# Patient Record
Sex: FEMALE | Race: WHITE
[De-identification: names, ages, dates, MRNs, and addresses within clinical notes are randomized per-mention and may not be internally consistent; named-entity substitution may affect disease eponyms.]

---

## 2020-11-04 ENCOUNTER — HOSPITAL ENCOUNTER (EMERGENCY)
Dept: HOSPITAL 7 - FB.ED | Age: 45
Discharge: NURSING, REHAB OR LONG-TERM CARE | End: 2020-11-04
Payer: MEDICAID

## 2020-11-04 DIAGNOSIS — Z88.5: ICD-10-CM

## 2020-11-04 DIAGNOSIS — I25.10: ICD-10-CM

## 2020-11-04 DIAGNOSIS — Z95.1: ICD-10-CM

## 2020-11-04 DIAGNOSIS — E78.00: ICD-10-CM

## 2020-11-04 DIAGNOSIS — E11.40: ICD-10-CM

## 2020-11-04 DIAGNOSIS — Z79.4: ICD-10-CM

## 2020-11-04 DIAGNOSIS — Z79.899: ICD-10-CM

## 2020-11-04 DIAGNOSIS — I10: ICD-10-CM

## 2020-11-04 DIAGNOSIS — Z88.6: ICD-10-CM

## 2020-11-04 DIAGNOSIS — F17.210: ICD-10-CM

## 2020-11-04 DIAGNOSIS — E03.9: ICD-10-CM

## 2020-11-04 DIAGNOSIS — G45.9: Primary | ICD-10-CM

## 2020-11-04 DIAGNOSIS — Z79.82: ICD-10-CM

## 2020-11-04 DIAGNOSIS — I25.2: ICD-10-CM

## 2020-11-04 DIAGNOSIS — M19.90: ICD-10-CM

## 2020-11-04 DIAGNOSIS — J45.909: ICD-10-CM

## 2020-11-04 NOTE — CR
INDICATION:  Aphasia.  



CHEST, ONE VIEW:  A single AP upright view of the chest was obtained 11/04/20 - 
no comparisons available.  



The heart did not appear enlarged or abnormal in shape. 



Median sternotomy is noted with the superior most two wire sutures fractured.  



An active infiltrate or effusion was not identified.  



IMPRESSION:  No acute process. 



MTDD

## 2020-11-04 NOTE — EDM.PDOC
ED HPI GENERAL MEDICAL PROBLEM





- General


Chief Complaint: Neuro Symptoms/Deficits


Stated Complaint: TROUBLE SPEAKING


Time Seen by Provider: 20 02:45


Source of Information: Reports: Patient


History Limitations: Reports: No Limitations





- History of Present Illness


INITIAL COMMENTS - FREE TEXT/NARRATIVE: 





Patient presented to the ED because of word finding difficulty at about 0200 

when she was talking to her daughter. The boyfriend talked to Dilia and he 

noticed that she has difficulty in finding the words to say and it takes her a 

while to respond. She denies any double or blurry vision, no motor or sensory 

deficits. She scored 1 on the NIHSS.





- Related Data


                                    Allergies











Allergy/AdvReac Type Severity Reaction Status Date / Time


 


codeine Allergy  Vomiting Verified 20 02:46


 


naproxen [From Aleve] Allergy  Rash Verified 20 02:46











Home Meds: 


                                    Home Meds





Amiodarone [Cordarone] 200 mg PO DAILY 19 [History]


Furosemide 20 mg DAILY 19 [History]


Insulin Glargine,Hum.Rec.Anlog [Basaglar Kwikpen U-100] 30 unit SQ BEDTIME 

19 [History]


Levothyroxine [Synthroid] 50 mcg PO ACBREAKFAST 19 [History]


atorvaSTATin Calcium [Atorvastatin Calcium] 80 mg PO 1200 19 [History]


carvediloL [Carvedilol] 6.25 mg PO DAILY 19 [History]


metFORMIN [Glucophage XR] 500 mg PO BIDMEALS 19 [History]


Aspirin [Ecotrin EC] 81 mg PO DAILY 20 [History]











Past Medical History


Cardiovascular History: Reports: CAD, High Cholesterol, Hypertension, MI


Other Cardiovascular History: 3 vessel bypass


Respiratory History: Reports: Asthma


Gastrointestinal History: Reports: Chronic Constipation, GERD, GI Bleed, PUD


Genitourinary History: Reports: None


OB/GYN History: Reports: Pregnancy


Other OB/GYN History: [id number]


Musculoskeletal History: Reports: Arthritis


Neurological History: Reports: Neuropathy, Diabetic


Endocrine/Metabolic History: Reports: Diabetes, Type II, Hypothyroidism





- Infectious Disease History


Infectious Disease History: Reports: Chicken Pox, Shingles





- Past Surgical History


HEENT Surgical History: Reports: Myringotomy w Tube(s)


Other HEENT Surgeries/Procedures: bilat tubes in ears


Cardiovascular Surgical History: Reports: Coronary Artery Bypass


GI Surgical History: Reports: EGD


Female  Surgical History: Reports:  Section, Tubal Ligation





Social & Family History





- Family History


Family Medical History: Noncontributory


Endocrine/Metabolic: Reports: Diabetes, Type I





- Tobacco Use


Tobacco Use Status *Q: Current Every Day Tobacco User


Years of Tobacco use: 30


Packs/Tins Daily: 0.5





- Caffeine Use


Caffeine Use: Reports: Soda





- Recreational Drug Use


Recreational Drug Use: No





- Living Situation & Occupation


Occupation: Unemployed





ED ROS GENERAL





- Review of Systems


Review Of Systems: See Below


Constitutional: Reports: No Symptoms


HEENT: Reports: No Symptoms


Respiratory: Reports: No Symptoms


Cardiovascular: Reports: No Symptoms


Endocrine: Reports: No Symptoms


GI/Abdominal: Reports: No Symptoms


: Reports: No Symptoms


Musculoskeletal: Reports: No Symptoms


Skin: Reports: No Symptoms


Neurological: Reports: Other (delayed in verbal response)





ED EXAM, NEURO





- Physical Exam


Exam: See Below


Exam Limited By: No Limitations


General Appearance: Alert, No Apparent Distress


Eye Exam: Bilateral Eye: PERRL


Ears: Normal External Exam, Normal Canal


Nose: Normal Inspection, Normal Mucosa


Throat/Mouth: Normal Inspection, Normal Lips, Normal Teeth


Head Exam: Atraumatic, Normocephalic


Neck: Normal Inspection, Supple, Non-Tender, Full Range of Motion


Respiratory/Chest: No Respiratory Distress, Lungs Clear, Normal Breath Sounds


Cardiovascular: Normal Peripheral Pulses, Regular Rate, Rhythm, No Edema


GI/Abdominal: Normal Bowel Sounds, Soft, Non-Tender, No Organomegaly


Neurological: Alert, Normal Mood/Affect, Normal Dorsiflexion, CN II-XII Intact, 

Normal Plantar Flexion, Normal Gait, No Motor/Sensory Deficits, Oriented x 3





Course





- Vital Signs


Text/Narrative:: 





Labs/EKG/CXR/Head CT result was discussed with patient


Patient refused to go to [place] by an ambulance. She said she want to see her 

kids first and then her boyfriend will drop her to [place].


Last Recorded V/S: 





                                Last Vital Signs











Temp  36.7 C   20 02:40


 


Pulse  83   20 02:40


 


Resp  18   20 02:40


 


BP  165/88 H  20 02:40


 


Pulse Ox  100   20 02:40














- Orders/Labs/Meds


Orders: 





                               Active Orders 24 hr











 Category Date Time Status


 


 EKG Documentation Completion [RC] ASDIRECTED Care  20 03:05 Active


 


 Chest 1V Frontal [CR] Stat Exams  20 03:03 Taken


 


 Head wo Cont [CT] Stat Exams  20 03:03 Taken


 


 Sodium Chloride 0.9% [Saline Flush] Med  20 03:03 Active





 10 ml FLUSH ASDIRECTED PRN   


 


 Saline Lock Insert [OM.PC] Routine Oth  20 03:03 Ordered


 


 EKG 12 Lead [EK] Routine Ther  20 03:03 Ordered








                                Medication Orders





Sodium Chloride (Saline Flush)  10 ml FLUSH ASDIRECTED PRN


   PRN Reason: Keep Vein Open








Labs: 





                                Laboratory Tests











  20 Range/Units





  02:59 02:59 02:59 


 


WBC  9.3    (4.5-12.0)  X10-3/uL


 


RBC  4.39    (3.23-5.20)  x10(6)uL


 


Hgb  13.6    (11.5-15.5)  g/dL


 


Hct  39.9    (30.0-51.3)  %


 


MCV  91.0    (80-96)  fL


 


MCH  31.0    (27.7-33.6)  pg


 


MCHC  34.0    (32.2-35.4)  g/dL


 


RDW  12.2    (11.5-15.5)  %


 


Plt Count  333    (125-369)  X10(3)uL


 


MPV  7.5    (7.4-10.4)  fL


 


Neut % (Auto)  65.4    (46-82)  %


 


Lymph % (Auto)  23.3    (13-37)  %


 


Mono % (Auto)  5.3    (4-12)  %


 


Eos % (Auto)  6 H    (1.0-5.0)  %


 


Baso % (Auto)  1    (0-2)  %


 


Neut # (Auto)  6.1    (1.6-8.3)  #


 


Lymph # (Auto)  2.2    (0.6-5.0)  #


 


Mono # (Auto)  0.5    (0.0-1.3)  #


 


Eos # (Auto)  0.5    (0.0-0.8)  #


 


Baso # (Auto)  0.0    (0.0-0.2)  #


 


PT   10.3   (9.0-11.1)  sec


 


INR   0.95 L   (1.00-1.24)  


 


APTT   28.0   (24.4-33.2)  SECONDS


 


Sodium    138  (135-145)  mmol/L


 


Potassium    3.6  D  (3.5-5.3)  mmol/L


 


Chloride    103  D  (100-110)  mmol/L


 


Carbon Dioxide    26  (21-32)  mmol/L


 


BUN    14  D  (7-18)  mg/dL


 


Creatinine    0.9  (0.55-1.02)  mg/dL


 


Est Cr Clr Drug Dosing    62.43  mL/min


 


Estimated GFR (MDRD)    > 60  (>60)  


 


BUN/Creatinine Ratio    15.6  (9-20)  


 


Glucose    195 H D  ()  mg/dL


 


Calcium    8.5 L D  (8.6-10.2)  mg/dL


 


Total Bilirubin    0.3  (0.1-1.3)  mg/dL


 


AST    10  (5-25)  IU/L


 


ALT    16  D  (12-36)  U/L


 


Alkaline Phosphatase    85  ()  IU/L


 


Troponin I     (4.0-60.3)  pg/mL


 


Total Protein    7.4  (6.0-8.0)  g/dL


 


Albumin    3.5  (3.5-5.2)  g/dL


 


Globulin    3.9  g/dL


 


Albumin/Globulin Ratio    0.9  


 


TSH, Ultra Sensitive     (0.36-3.74)  IU/mL














  20 Range/Units





  02:59 02:59 


 


WBC    (4.5-12.0)  X10-3/uL


 


RBC    (3.23-5.20)  x10(6)uL


 


Hgb    (11.5-15.5)  g/dL


 


Hct    (30.0-51.3)  %


 


MCV    (80-96)  fL


 


MCH    (27.7-33.6)  pg


 


MCHC    (32.2-35.4)  g/dL


 


RDW    (11.5-15.5)  %


 


Plt Count    (125-369)  X10(3)uL


 


MPV    (7.4-10.4)  fL


 


Neut % (Auto)    (46-82)  %


 


Lymph % (Auto)    (13-37)  %


 


Mono % (Auto)    (4-12)  %


 


Eos % (Auto)    (1.0-5.0)  %


 


Baso % (Auto)    (0-2)  %


 


Neut # (Auto)    (1.6-8.3)  #


 


Lymph # (Auto)    (0.6-5.0)  #


 


Mono # (Auto)    (0.0-1.3)  #


 


Eos # (Auto)    (0.0-0.8)  #


 


Baso # (Auto)    (0.0-0.2)  #


 


PT    (9.0-11.1)  sec


 


INR    (1.00-1.24)  


 


APTT    (24.4-33.2)  SECONDS


 


Sodium    (135-145)  mmol/L


 


Potassium    (3.5-5.3)  mmol/L


 


Chloride    (100-110)  mmol/L


 


Carbon Dioxide    (21-32)  mmol/L


 


BUN    (7-18)  mg/dL


 


Creatinine    (0.55-1.02)  mg/dL


 


Est Cr Clr Drug Dosing    mL/min


 


Estimated GFR (MDRD)    (>60)  


 


BUN/Creatinine Ratio    (9-20)  


 


Glucose    ()  mg/dL


 


Calcium    (8.6-10.2)  mg/dL


 


Total Bilirubin    (0.1-1.3)  mg/dL


 


AST    (5-25)  IU/L


 


ALT    (12-36)  U/L


 


Alkaline Phosphatase    ()  IU/L


 


Troponin I  6.8   (4.0-60.3)  pg/mL


 


Total Protein    (6.0-8.0)  g/dL


 


Albumin    (3.5-5.2)  g/dL


 


Globulin    g/dL


 


Albumin/Globulin Ratio    


 


TSH, Ultra Sensitive   18.01 H*  (0.36-3.74)  IU/mL











Meds: 





Medications











Generic Name Dose Route Start Last Admin





  Trade Name Freq  PRN Reason Stop Dose Admin


 


Sodium Chloride  10 ml  20 03:03 





  Saline Flush  FLUSH  





  ASDIRECTED PRN  





  Keep Vein Open  














Departure





- Departure


Time of Disposition: 04:00


Disposition: DC/Tfer to Acute Hospital 02


Condition: Good


Clinical Impression: 


 TIA (transient ischemic attack), Hypothyroidism








- Discharge Information


Referrals: 


Arnaud Ferrera MD [Primary Care Provider] - 


Additional Instructions: 


Proceed to [facility] ER





Sepsis Event Note (ED)





- Evaluation


Sepsis Screening Result: No Definite Risk





- Focused Exam


Vital Signs: 





                                   Vital Signs











  Temp Pulse Resp BP Pulse Ox


 


 20 02:40  36.7 C  83  18  165/88 H  100














- My Orders


Last 24 Hours: 





My Active Orders





20 03:03


Chest 1V Frontal [CR] Stat 


Head wo Cont [CT] Stat 


Sodium Chloride 0.9% [Saline Flush]   10 ml FLUSH ASDIRECTED PRN 


Saline Lock Insert [OM.PC] Routine 


EKG 12 Lead [EK] Routine 





20 03:05


EKG Documentation Completion [RC] ASDIRECTED 














- Assessment/Plan


Last 24 Hours: 





My Active Orders





20 03:03


Chest 1V Frontal [CR] Stat 


Head wo Cont [CT] Stat 


Sodium Chloride 0.9% [Saline Flush]   10 ml FLUSH ASDIRECTED PRN 


Saline Lock Insert [OM.PC] Routine 


EKG 12 Lead [EK] Routine 





20 03:05


EKG Documentation Completion [RC] ASDIRECTED

## 2021-12-13 ENCOUNTER — HOSPITAL ENCOUNTER (EMERGENCY)
Dept: HOSPITAL 7 - FB.ED | Age: 46
Discharge: HOME | End: 2021-12-13
Payer: MEDICAID

## 2021-12-13 DIAGNOSIS — Z20.822: ICD-10-CM

## 2021-12-13 DIAGNOSIS — J44.1: Primary | ICD-10-CM

## 2021-12-13 DIAGNOSIS — Z72.0: ICD-10-CM

## 2021-12-13 DIAGNOSIS — I25.2: ICD-10-CM

## 2021-12-13 DIAGNOSIS — E03.9: ICD-10-CM

## 2021-12-13 DIAGNOSIS — Z79.899: ICD-10-CM

## 2021-12-13 DIAGNOSIS — Z79.82: ICD-10-CM

## 2021-12-13 DIAGNOSIS — J01.90: ICD-10-CM

## 2021-12-13 DIAGNOSIS — I25.10: ICD-10-CM

## 2021-12-13 DIAGNOSIS — Z79.4: ICD-10-CM

## 2021-12-13 DIAGNOSIS — E78.00: ICD-10-CM

## 2021-12-13 DIAGNOSIS — E10.40: ICD-10-CM

## 2021-12-13 DIAGNOSIS — I10: ICD-10-CM

## 2021-12-13 DIAGNOSIS — Z88.5: ICD-10-CM

## 2021-12-13 PROCEDURE — 84484 ASSAY OF TROPONIN QUANT: CPT

## 2021-12-13 PROCEDURE — 83880 ASSAY OF NATRIURETIC PEPTIDE: CPT

## 2021-12-13 PROCEDURE — 71275 CT ANGIOGRAPHY CHEST: CPT

## 2021-12-13 PROCEDURE — U0002 COVID-19 LAB TEST NON-CDC: HCPCS

## 2021-12-13 PROCEDURE — 99285 EMERGENCY DEPT VISIT HI MDM: CPT

## 2021-12-13 PROCEDURE — 85379 FIBRIN DEGRADATION QUANT: CPT

## 2021-12-13 PROCEDURE — 80048 BASIC METABOLIC PNL TOTAL CA: CPT

## 2021-12-13 PROCEDURE — 87635 SARS-COV-2 COVID-19 AMP PRB: CPT

## 2021-12-13 PROCEDURE — 36415 COLL VENOUS BLD VENIPUNCTURE: CPT

## 2021-12-13 PROCEDURE — 71045 X-RAY EXAM CHEST 1 VIEW: CPT

## 2021-12-13 PROCEDURE — 93005 ELECTROCARDIOGRAM TRACING: CPT

## 2021-12-13 PROCEDURE — 85025 COMPLETE CBC W/AUTO DIFF WBC: CPT

## 2021-12-13 NOTE — CR
INDICATION:  Short of breath.



CHEST, ONE VIEW:  Portable AP upright view of the chest, 12/13/21, compared with
11/04/20.



The heart did not appear enlarged with post median sternotomy change. 



A definite active infiltrate or effusion was not identified with pulmonary 
markings similar to the previous study.  



IMPRESSION:  Stable chest.  No acute process. 

MTDD

## 2021-12-13 NOTE — CT
INDICATION:  Dyspnea, elevated D-dimer, smoker one pack-per-day



COMPUTERIZED TOMOGRAPHY ANGIOGRAPHY OF THE CHEST WITH CONTRAST:  Spiral 1.25 mm 
axial sections were obtained through the chest with 75 mL [id number]-[id number] at 4 
mL/second with sagittal, axial and coronal reconstructions, 12/13/21 and 
compared with 01/16/19.



TOTAL EXAM DLP:  255.42 mGy/cm.



The thyroid lobes appear to be increased in size compared with the previous 
examination suggesting thyromegaly.  The right lobe extends into the upper chest
slightly.  



There is some minimal calcification in the arch of the aorta.  



Mild degree of mediastinal lymphadenopathy is noted, which is stable and 
nonspecific. 



Coronary artery calcification is suggested.  



The heart appears to be at the upper limits of normal in size and may be 
slightly increased in size compared with the previous examination. 



No pericardial effusion was seen.



Upper abdomen included on the study revealed aortic and splenic artery 
calcifications.



Mild emphysematous changes are noted in the lungs. 



A definite active infiltrate or effusion was not identified. 



No nodule or masses were seen. 



No evidence of pulmonary emboli could be identified. 



IMPRESSION:  

1.  No evidence of pulmonary emboli.

2.  No acute process in the chest.  

3.  ASD/ASHD.



Report was called to Dr. Alcaraz at 1735 hours. 

[facility]

## 2021-12-13 NOTE — EDM.PDOC
ED HPI GENERAL MEDICAL PROBLEM





- General


Chief Complaint: General


Stated Complaint: COVID


Time Seen by Provider: 21 13:45


Source of Information: Reports: Patient


History Limitations: Reports: No Limitations





- History of Present Illness


INITIAL COMMENTS - FREE TEXT/NARRATIVE: 





Patient presented to the ED because cough productive of yellowish phlegm, 

weakness, fatigue and dyspnea for 1 week. There is no fever but has some chills.

She also c/o nausea but no vomiting or diarrhea. No chest pain, palpitations or 

leg edema.





- Related Data


                                    Allergies











Allergy/AdvReac Type Severity Reaction Status Date / Time


 


codeine Allergy  Vomiting Verified 20 02:46


 


naproxen [From Aleve] Allergy  Rash Verified 20 02:46











Home Meds: 


                                    Home Meds





Amiodarone [Cordarone] 200 mg PO DAILY 19 [History]


Furosemide 20 mg DAILY 19 [History]


Insulin Glargine,Hum.Rec.Anlog [Basaglar Kwikpen U-100] 30 unit SQ BEDTIME 

19 [History]


Levothyroxine [Synthroid] 50 mcg PO ACBREAKFAST 19 [History]


atorvaSTATin Calcium [Atorvastatin Calcium] 80 mg PO 1200 19 [History]


carvediloL [Carvedilol] 6.25 mg PO DAILY 19 [History]


metFORMIN [Glucophage XR] 500 mg PO BIDMEALS 19 [History]


Aspirin [Ecotrin EC] 81 mg PO DAILY 20 [History]


Amoxicillin/Potassium Clav [Augmentin 875-125 Tablet] 1 each PO BID #20 tablet 

21 [Rx]


predniSONE [Prednisone] 20 mg PO DAILY #7 tablet 21 [Rx]











Past Medical History


Cardiovascular History: Reports: CAD, High Cholesterol, Hypertension, MI


Other Cardiovascular History: 3 vessel bypass


Respiratory History: Reports: Asthma


Gastrointestinal History: Reports: Chronic Constipation, GERD, GI Bleed, PUD


Genitourinary History: Reports: None


OB/GYN History: Reports: Pregnancy


Other OB/GYN History: [id number]


Musculoskeletal History: Reports: Arthritis


Neurological History: Reports: Neuropathy, Diabetic


Endocrine/Metabolic History: Reports: Diabetes, Type II, Hypothyroidism





- Infectious Disease History


Infectious Disease History: Reports: Chicken Pox, Shingles





- Past Surgical History


HEENT Surgical History: Reports: Myringotomy w Tube(s)


Other HEENT Surgeries/Procedures: bilat tubes in ears


Cardiovascular Surgical History: Reports: Coronary Artery Bypass


GI Surgical History: Reports: EGD


Female  Surgical History: Reports:  Section, Tubal Ligation


Other Female  Surgeries/Procedures: CS x 1





Social & Family History





- Family History


Family Medical History: No Pertinent Family History


Endocrine/Metabolic: Reports: Diabetes, Type I





- Tobacco Use


Tobacco Use Status *Q: Current Every Day Tobacco User


Years of Tobacco use: 10


Packs/Tins Daily: 1





- Caffeine Use


Caffeine Use: Reports: None





- Living Situation & Occupation


Occupation: Unemployed





ED ROS GENERAL





- Review of Systems


Review Of Systems: See Below


Constitutional: Reports: No Symptoms


HEENT: Reports: No Symptoms


Respiratory: Reports: Shortness of Breath, Cough


Cardiovascular: Reports: No Symptoms


Endocrine: Reports: No Symptoms


GI/Abdominal: Reports: No Symptoms


: Reports: No Symptoms


Musculoskeletal: Reports: No Symptoms


Skin: Reports: No Symptoms


Neurological: Reports: No Symptoms


Psychiatric: Reports: Hallucinations


Hematologic/Lymphatic: Reports: No Symptoms


Immunologic: Reports: No Symptoms





ED EXAM, GENERAL





- Physical Exam


Exam: See Below


Exam Limited By: No Limitations


General Appearance: Alert, No Apparent Distress


Ears: Normal External Exam, Normal Canal


Nose: Normal Inspection, Normal Mucosa


Throat/Mouth: Normal Inspection, Normal Lips, Normal Teeth


Head: Atraumatic, Normocephalic


Neck: Normal Inspection, Supple, Non-Tender, Full Range of Motion


Respiratory/Chest: No Respiratory Distress, Normal Breath Sounds, Rhonchi, 

Wheezing


Cardiovascular: Normal Peripheral Pulses, Regular Rate, Rhythm, No Edema, No 

Gallop, No JVD, No Murmur


GI/Abdominal: Normal Bowel Sounds, Soft, Non-Tender, No Organomegaly, No 

Distention


Back Exam: Normal Inspection, Full Range of Motion


Extremities: Normal Inspection, Normal Range of Motion, Non-Tender, No Pedal 

Edema, Normal Capillary Refill


Neurological: Alert, Oriented, CN II-XII Intact, Normal Cognition, Normal Gait, 

Normal Reflexes, No Motor/Sensory Deficits


Psychiatric: Normal Affect


Skin Exam: Warm


  ** #1 Interpretation


EKG Date: 21


Time: 13:57


Rhythm: NSR


Rate (Beats/Min): 79


Axis: Normal


P-Wave: Present


QRS: Normal


ST-T: Normal


QT: Normal


FL/PQ Interval: 162


Comparison: No Change


EKG Interpretation Comments: 





NSR


Old anterior infarct


LAE





Course





- Vital Signs


Text/Narrative:: 





Lab/EKG/CXR/Chest CTA result was reviewed and discussed with patient


Last Recorded V/S: 


                                Last Vital Signs











Temp  36.4 C   21 13:41


 


Pulse  82   21 13:41


 


Resp  16   21 13:41


 


BP  179/91 H  21 13:41


 


Pulse Ox  99   21 13:41














- Orders/Labs/Meds


Orders: 


                               Active Orders 24 hr











 Category Date Time Status


 


 Ang Chest [CT] Stat Exams  21 14:34 Taken


 


 Sodium Chloride 0.9% [Saline Flush] Med  21 13:43 Active





 10 ml FLUSH ASDIRECTED PRN   


 


 Saline Lock Insert [OM.PC] Routine Oth  21 13:43 Ordered


 


 EKG 12 Lead [EK] Routine Ther  21 13:43 Ordered








                                Medication Orders





Sodium Chloride (Sodium Chloride 0.9% 10 Ml Syringe)  10 ml FLUSH ASDIRECTED PRN


   PRN Reason: Keep Vein Open


   Last Admin: 21 13:54  Dose: 10 ml


   Documented by: ACACIA








Labs: 


                                Laboratory Tests











  21 Range/Units





  13:55 13:55 13:55 


 


WBC  11.0 H    (3.0-10.3)  x10-3/uL


 


RBC  4.01    (3.60-5.20)  x10(6)uL


 


Hgb  12.0    (11.4-15.5)  g/dL


 


Hct  36.1    (34.2-48.2)  %


 


MCV  90.0    (76.7-100.5)  fL


 


MCH  29.9    (23.9-33.9)  pg


 


MCHC  33.2    (31.9-34.8)  g/dL


 


RDW  12.1 L    (12.3-16.5)  %


 


Plt Count  296    (151-488)  x10(3)uL


 


MPV  7.9    (7.1-12.4)  fL


 


Neut % (Auto)  73.2    (30.8-76.2)  %


 


Lymph % (Auto)  17.8 L    (18.4-52.1)  %


 


Mono % (Auto)  5.2    (4.4-15.7)  %


 


Eos % (Auto)  3.3    (0.6-8.1)  %


 


Baso % (Auto)  0.5    (0.2-1.5)  %


 


Neut # (Auto)  8.1 H    (1.5-6.3)  x10-3/uL


 


Lymph # (Auto)  2.0    (1.0-4.4)  x10-3/uL


 


Mono # (Auto)  0.6    (0.3-1.0)  x10-3/uL


 


Eos # (Auto)  0.4    (0.0-0.8)  x10-3/uL


 


Baso # (Auto)  0.1    (0.0-0.1)  x10-3/uL


 


D-Dimer, Quantitative   1.03 H   (0.0-0.59)  mg/LFEU


 


Sodium    130 L  (135-145)  mmol/L


 


Potassium    5.0  D  (3.5-5.3)  mmol/L


 


Chloride    96 L D  (100-110)  mmol/L


 


Carbon Dioxide    27  (21-32)  mmol/L


 


BUN    17  (7-18)  mg/dL


 


Creatinine    1.3 H  (0.55-1.02)  mg/dL


 


Est Cr Clr Drug Dosing    TNP  


 


Estimated GFR (MDRD)    44 L  (>60)  


 


BUN/Creatinine Ratio    13.1  (9-20)  


 


Glucose    375 H D  ()  mg/dL


 


Calcium    9.7  (8.6-10.2)  mg/dL


 


Troponin I     (4.0-60.3)  pg/mL


 


NT-Pro-B Natriuret Pep     (<=125)  pg/mL


 


SARS-CoV-2 RNA (MAGO)     (NEGATIVE)  














  21 Range/Units





  13:55 14:11 


 


WBC    (3.0-10.3)  x10-3/uL


 


RBC    (3.60-5.20)  x10(6)uL


 


Hgb    (11.4-15.5)  g/dL


 


Hct    (34.2-48.2)  %


 


MCV    (76.7-100.5)  fL


 


MCH    (23.9-33.9)  pg


 


MCHC    (31.9-34.8)  g/dL


 


RDW    (12.3-16.5)  %


 


Plt Count    (151-488)  x10(3)uL


 


MPV    (7.1-12.4)  fL


 


Neut % (Auto)    (30.8-76.2)  %


 


Lymph % (Auto)    (18.4-52.1)  %


 


Mono % (Auto)    (4.4-15.7)  %


 


Eos % (Auto)    (0.6-8.1)  %


 


Baso % (Auto)    (0.2-1.5)  %


 


Neut # (Auto)    (1.5-6.3)  x10-3/uL


 


Lymph # (Auto)    (1.0-4.4)  x10-3/uL


 


Mono # (Auto)    (0.3-1.0)  x10-3/uL


 


Eos # (Auto)    (0.0-0.8)  x10-3/uL


 


Baso # (Auto)    (0.0-0.1)  x10-3/uL


 


D-Dimer, Quantitative    (0.0-0.59)  mg/LFEU


 


Sodium    (135-145)  mmol/L


 


Potassium    (3.5-5.3)  mmol/L


 


Chloride    (100-110)  mmol/L


 


Carbon Dioxide    (21-32)  mmol/L


 


BUN    (7-18)  mg/dL


 


Creatinine    (0.55-1.02)  mg/dL


 


Est Cr Clr Drug Dosing    


 


Estimated GFR (MDRD)    (>60)  


 


BUN/Creatinine Ratio    (9-20)  


 


Glucose    ()  mg/dL


 


Calcium    (8.6-10.2)  mg/dL


 


Troponin I  5.4   (4.0-60.3)  pg/mL


 


NT-Pro-B Natriuret Pep  1416 H*   (<=125)  pg/mL


 


SARS-CoV-2 RNA (MAGO)   Negative  (NEGATIVE)  











Meds: 


Medications











Generic Name Dose Route Start Last Admin





  Trade Name Freq  PRN Reason Stop Dose Admin


 


Sodium Chloride  10 ml  21 13:43  21 13:54





  Sodium Chloride 0.9% 10 Ml Syringe  FLUSH   10 ml





  ASDIRECTED PRN   Administration





  Keep Vein Open  














Discontinued Medications














Generic Name Dose Route Start Last Admin





  Trade Name Freq  PRN Reason Stop Dose Admin


 


Iopamidol  75 ml  21 14:43  21 15:21





  Iopamidol 755 Mg/Ml 75 Ml Bottle  IV  21 14:44  75 ml





  ASDIRECTED ONE   Administration














Departure





- Departure


Time of Disposition: 15:35


Disposition: Home, Self-Care 01


Condition: Good


Clinical Impression: 


 Acute sinusitis, COPD exacerbation








- Discharge Information


Prescriptions: 


Amoxicillin/Potassium Clav [Augmentin 875-125 Tablet] 1 each PO BID #20 tablet


predniSONE [Prednisone] 20 mg PO DAILY #7 tablet


Instructions:  Chronic Obstructive Pulmonary Disease Exacerbation, Easy-to-Read,

 Sinusitis, Adult, Easy-to-Read


Referrals: 


Laura Diaz, NP [Primary Care Provider] - 


Forms:  ED Department Discharge


Additional Instructions: 


Please read discharge instructions on sinusitis and COPD flare up


Mucinex(over the counter)600 mg twice daily for 5 days


Augmentin 875 mg twice daily for 10 days


Prednisone 20 mg daily for 7  days


Continue your inhaler


DO NOT TAKE YOUR METFORMIN/GLUCOPHAGE FOR 2 DAYS


Follow up as needed








Sepsis Event Note (ED)





- Evaluation


Sepsis Screening Result: No Definite Risk





- Focused Exam


Vital Signs: 


                                   Vital Signs











  Temp Pulse Resp BP Pulse Ox


 


 21 13:41  36.4 C  82  16  179/91 H  99














- My Orders


Last 24 Hours: 


My Active Orders





21 13:43


Sodium Chloride 0.9% [Saline Flush]   10 ml FLUSH ASDIRECTED PRN 


Saline Lock Insert [OM.PC] Routine 


EKG 12 Lead [EK] Routine 





21 14:34


Ang Chest [CT] Stat 














- Assessment/Plan


Last 24 Hours: 


My Active Orders





21 13:43


Sodium Chloride 0.9% [Saline Flush]   10 ml FLUSH ASDIRECTED PRN 


Saline Lock Insert [OM.PC] Routine 


EKG 12 Lead [EK] Routine 





21 14:34


Ang Chest [CT] Stat